# Patient Record
Sex: FEMALE | Race: OTHER | Employment: STUDENT | URBAN - METROPOLITAN AREA
[De-identification: names, ages, dates, MRNs, and addresses within clinical notes are randomized per-mention and may not be internally consistent; named-entity substitution may affect disease eponyms.]

---

## 2022-05-01 ENCOUNTER — HOSPITAL ENCOUNTER (EMERGENCY)
Age: 27
Discharge: HOME | End: 2022-05-01
Attending: STUDENT IN AN ORGANIZED HEALTH CARE EDUCATION/TRAINING PROGRAM
Payer: COMMERCIAL

## 2022-05-01 VITALS
RESPIRATION RATE: 16 BRPM | DIASTOLIC BLOOD PRESSURE: 86 MMHG | TEMPERATURE: 98.1 F | OXYGEN SATURATION: 99 % | SYSTOLIC BLOOD PRESSURE: 138 MMHG | HEART RATE: 69 BPM

## 2022-05-01 DIAGNOSIS — N93.9 VAGINAL BLEEDING: Primary | ICD-10-CM

## 2022-05-01 LAB
ALBUMIN SERPL-MCNC: 5 G/DL (ref 3.4–5)
ALBUMIN/GLOB SERPL: 1.7 {RATIO} (ref 0.8–1.7)
ALP SERPL-CCNC: 40 U/L (ref 45–117)
ALT SERPL-CCNC: 16 U/L (ref 13–56)
ANION GAP SERPL CALC-SCNC: 4 MMOL/L (ref 3–18)
APPEARANCE UR: ABNORMAL
AST SERPL-CCNC: 12 U/L (ref 10–38)
BACTERIA URNS QL MICRO: NEGATIVE /HPF
BASOPHILS # BLD: 0.1 K/UL (ref 0–0.1)
BASOPHILS NFR BLD: 1 % (ref 0–2)
BILIRUB SERPL-MCNC: 1.2 MG/DL (ref 0.2–1)
BILIRUB UR QL: ABNORMAL
BUN SERPL-MCNC: 14 MG/DL (ref 7–18)
BUN/CREAT SERPL: 16 (ref 12–20)
CALCIUM SERPL-MCNC: 10.2 MG/DL (ref 8.5–10.1)
CHLORIDE SERPL-SCNC: 106 MMOL/L (ref 100–111)
CO2 SERPL-SCNC: 27 MMOL/L (ref 21–32)
COLOR UR: ABNORMAL
CREAT SERPL-MCNC: 0.9 MG/DL (ref 0.6–1.3)
DIFFERENTIAL METHOD BLD: ABNORMAL
EOSINOPHIL # BLD: 0.1 K/UL (ref 0–0.4)
EOSINOPHIL NFR BLD: 1 % (ref 0–5)
EPITH CASTS URNS QL MICRO: NORMAL /LPF (ref 0–5)
ERYTHROCYTE [DISTWIDTH] IN BLOOD BY AUTOMATED COUNT: 12 % (ref 11.6–14.5)
GLOBULIN SER CALC-MCNC: 2.9 G/DL (ref 2–4)
GLUCOSE SERPL-MCNC: 85 MG/DL (ref 74–99)
GLUCOSE UR STRIP.AUTO-MCNC: NEGATIVE MG/DL
HCG SERPL QL: NEGATIVE
HCG SERPL-ACNC: <1 MIU/ML (ref 0–10)
HCT VFR BLD AUTO: 40.7 % (ref 35–45)
HGB BLD-MCNC: 13.6 G/DL (ref 12–16)
HGB UR QL STRIP: ABNORMAL
IMM GRANULOCYTES # BLD AUTO: 0 K/UL (ref 0–0.04)
IMM GRANULOCYTES NFR BLD AUTO: 0 % (ref 0–0.5)
KETONES UR QL STRIP.AUTO: ABNORMAL MG/DL
LEUKOCYTE ESTERASE UR QL STRIP.AUTO: NEGATIVE
LYMPHOCYTES # BLD: 2 K/UL (ref 0.9–3.6)
LYMPHOCYTES NFR BLD: 17 % (ref 21–52)
MCH RBC QN AUTO: 28.3 PG (ref 24–34)
MCHC RBC AUTO-ENTMCNC: 33.4 G/DL (ref 31–37)
MCV RBC AUTO: 84.8 FL (ref 78–100)
MONOCYTES # BLD: 0.8 K/UL (ref 0.05–1.2)
MONOCYTES NFR BLD: 7 % (ref 3–10)
NEUTS SEG # BLD: 8.6 K/UL (ref 1.8–8)
NEUTS SEG NFR BLD: 75 % (ref 40–73)
NITRITE UR QL STRIP.AUTO: NEGATIVE
NRBC # BLD: 0 K/UL (ref 0–0.01)
NRBC BLD-RTO: 0 PER 100 WBC
PH UR STRIP: 6 [PH] (ref 5–8)
PLATELET # BLD AUTO: 318 K/UL (ref 135–420)
PMV BLD AUTO: 10.6 FL (ref 9.2–11.8)
POTASSIUM SERPL-SCNC: 4.4 MMOL/L (ref 3.5–5.5)
PROT SERPL-MCNC: 7.9 G/DL (ref 6.4–8.2)
PROT UR STRIP-MCNC: 100 MG/DL
RBC # BLD AUTO: 4.8 M/UL (ref 4.2–5.3)
RBC #/AREA URNS HPF: NORMAL /HPF (ref 0–5)
SODIUM SERPL-SCNC: 137 MMOL/L (ref 136–145)
SP GR UR REFRACTOMETRY: 1.02 (ref 1–1.03)
TROPONIN-HIGH SENSITIVITY: 4 NG/L (ref 0–54)
UROBILINOGEN UR QL STRIP.AUTO: 0.2 EU/DL (ref 0.2–1)
WBC # BLD AUTO: 11.6 K/UL (ref 4.6–13.2)
WBC URNS QL MICRO: NORMAL /HPF (ref 0–4)

## 2022-05-01 PROCEDURE — 85025 COMPLETE CBC W/AUTO DIFF WBC: CPT

## 2022-05-01 PROCEDURE — 81001 URINALYSIS AUTO W/SCOPE: CPT

## 2022-05-01 PROCEDURE — 84703 CHORIONIC GONADOTROPIN ASSAY: CPT

## 2022-05-01 PROCEDURE — 99284 EMERGENCY DEPT VISIT MOD MDM: CPT

## 2022-05-01 PROCEDURE — 80053 COMPREHEN METABOLIC PANEL: CPT

## 2022-05-01 PROCEDURE — 84484 ASSAY OF TROPONIN QUANT: CPT

## 2022-05-01 PROCEDURE — 84702 CHORIONIC GONADOTROPIN TEST: CPT

## 2022-05-01 PROCEDURE — 93005 ELECTROCARDIOGRAM TRACING: CPT

## 2022-05-01 NOTE — ED PROVIDER NOTES
EMERGENCY DEPARTMENT HISTORY AND PHYSICAL EXAM    Date: 5/1/2022  Patient Name: Panda Villalta    History of Presenting Illness     Chief Complaint   Patient presents with    Vaginal Bleeding         History Provided By: Patient    Additional History (Context): Panda Villalta is a 27 y.o. female with No significant past medical history who presents with complaint of vaginal bleeding since yesterday. Initially was brownish mucoid he and then more bright red. She took Plan B on Tuesday. She had had a normal menstrual cycle just 2 days prior to this. Denies any pelvic cramping pain fever or flank pain. Denies lightheadedness or dizziness but she said last night she also had some palpitations. PCP: None        Past History     Past Medical History:  No past medical history on file. Past Surgical History:  No past surgical history on file. Family History:  No family history on file. Social History:  Social History     Tobacco Use    Smoking status: Not on file    Smokeless tobacco: Not on file   Substance Use Topics    Alcohol use: Not on file    Drug use: Not on file       Allergies:  No Known Allergies      Review of Systems   Review of Systems   Constitutional: Negative. HENT: Negative. Eyes: Negative. Respiratory: Negative. Cardiovascular: Positive for palpitations. Gastrointestinal: Negative. Endocrine: Negative. Genitourinary: Positive for vaginal bleeding. Negative for dysuria, flank pain, frequency, pelvic pain and vaginal discharge. Musculoskeletal: Negative. Skin: Negative. Allergic/Immunologic: Negative. Neurological: Negative for dizziness and light-headedness. Hematological: Negative. Psychiatric/Behavioral: Negative. All Other Systems Negative  Physical Exam     Vitals:    05/01/22 1523   BP: 138/86   Pulse: 69   Resp: 16   Temp: 98.1 °F (36.7 °C)   SpO2: 99%     Physical Exam  Vitals and nursing note reviewed.    Constitutional: Appearance: She is well-developed. HENT:      Head: Normocephalic and atraumatic. Eyes:      Pupils: Pupils are equal, round, and reactive to light. Neck:      Thyroid: No thyromegaly. Vascular: No JVD. Trachea: No tracheal deviation. Cardiovascular:      Rate and Rhythm: Normal rate and regular rhythm. Pulses: Normal pulses. Heart sounds: Normal heart sounds. No murmur heard. No friction rub. No gallop. Pulmonary:      Effort: Pulmonary effort is normal. No respiratory distress. Breath sounds: Normal breath sounds. No stridor. No wheezing or rales. Chest:      Chest wall: No tenderness. Abdominal:      General: There is no distension. Palpations: Abdomen is soft. There is no mass. Tenderness: There is no abdominal tenderness. There is no guarding or rebound. Musculoskeletal:         General: No tenderness. Lymphadenopathy:      Cervical: No cervical adenopathy. Skin:     General: Skin is warm and dry. Coloration: Skin is not pale. Findings: No erythema or rash. Neurological:      Mental Status: She is alert and oriented to person, place, and time. Psychiatric:         Behavior: Behavior normal.         Thought Content:  Thought content normal.            Diagnostic Study Results     Labs -     Recent Results (from the past 12 hour(s))   EKG, 12 LEAD, INITIAL    Collection Time: 05/01/22  4:28 PM   Result Value Ref Range    Ventricular Rate 70 BPM    Atrial Rate 70 BPM    P-R Interval 110 ms    QRS Duration 80 ms    Q-T Interval 374 ms    QTC Calculation (Bezet) 403 ms    Calculated P Axis 78 degrees    Calculated R Axis 83 degrees    Calculated T Axis 20 degrees    Diagnosis       Sinus rhythm with short CT  Otherwise normal ECG  No previous ECGs available     CBC WITH AUTOMATED DIFF    Collection Time: 05/01/22  4:30 PM   Result Value Ref Range    WBC 11.6 4.6 - 13.2 K/uL    RBC 4.80 4.20 - 5.30 M/uL    HGB 13.6 12.0 - 16.0 g/dL    HCT 40.7 35.0 - 45.0 %    MCV 84.8 78.0 - 100.0 FL    MCH 28.3 24.0 - 34.0 PG    MCHC 33.4 31.0 - 37.0 g/dL    RDW 12.0 11.6 - 14.5 %    PLATELET 711 626 - 013 K/uL    MPV 10.6 9.2 - 11.8 FL    NRBC 0.0 0  WBC    ABSOLUTE NRBC 0.00 0.00 - 0.01 K/uL    NEUTROPHILS 75 (H) 40 - 73 %    LYMPHOCYTES 17 (L) 21 - 52 %    MONOCYTES 7 3 - 10 %    EOSINOPHILS 1 0 - 5 %    BASOPHILS 1 0 - 2 %    IMMATURE GRANULOCYTES 0 0.0 - 0.5 %    ABS. NEUTROPHILS 8.6 (H) 1.8 - 8.0 K/UL    ABS. LYMPHOCYTES 2.0 0.9 - 3.6 K/UL    ABS. MONOCYTES 0.8 0.05 - 1.2 K/UL    ABS. EOSINOPHILS 0.1 0.0 - 0.4 K/UL    ABS. BASOPHILS 0.1 0.0 - 0.1 K/UL    ABS. IMM. GRANS. 0.0 0.00 - 0.04 K/UL    DF AUTOMATED     METABOLIC PANEL, COMPREHENSIVE    Collection Time: 05/01/22  4:30 PM   Result Value Ref Range    Sodium 137 136 - 145 mmol/L    Potassium 4.4 3.5 - 5.5 mmol/L    Chloride 106 100 - 111 mmol/L    CO2 27 21 - 32 mmol/L    Anion gap 4 3.0 - 18 mmol/L    Glucose 85 74 - 99 mg/dL    BUN 14 7.0 - 18 MG/DL    Creatinine 0.90 0.6 - 1.3 MG/DL    BUN/Creatinine ratio 16 12 - 20      GFR est AA >60 >60 ml/min/1.73m2    GFR est non-AA >60 >60 ml/min/1.73m2    Calcium 10.2 (H) 8.5 - 10.1 MG/DL    Bilirubin, total 1.2 (H) 0.2 - 1.0 MG/DL    ALT (SGPT) 16 13 - 56 U/L    AST (SGOT) 12 10 - 38 U/L    Alk.  phosphatase 40 (L) 45 - 117 U/L    Protein, total 7.9 6.4 - 8.2 g/dL    Albumin 5.0 3.4 - 5.0 g/dL    Globulin 2.9 2.0 - 4.0 g/dL    A-G Ratio 1.7 0.8 - 1.7     TROPONIN-HIGH SENSITIVITY    Collection Time: 05/01/22  4:30 PM   Result Value Ref Range    Troponin-High Sensitivity 4 0 - 54 ng/L   HCG QL SERUM    Collection Time: 05/01/22  4:30 PM   Result Value Ref Range    HCG, Ql. Negative NEG     URINALYSIS W/ RFLX MICROSCOPIC    Collection Time: 05/01/22  4:40 PM   Result Value Ref Range    Color DARK YELLOW      Appearance BLOODY      Specific gravity 1.025 1.005 - 1.030      pH (UA) 6.0 5.0 - 8.0      Protein 100 (A) NEG mg/dL    Glucose Negative NEG mg/dL    Ketone TRACE (A) NEG mg/dL    Bilirubin SMALL (A) NEG      Blood LARGE (A) NEG      Urobilinogen 0.2 0.2 - 1.0 EU/dL    Nitrites Negative NEG      Leukocyte Esterase Negative NEG     URINE MICROSCOPIC ONLY    Collection Time: 05/01/22  4:40 PM   Result Value Ref Range    WBC 4 to 10 0 - 4 /hpf    RBC TOO NUMEROUS TO COUNT 0 - 5 /hpf    Epithelial cells 1+ 0 - 5 /lpf    Bacteria Negative NEG /hpf       Radiologic Studies -   No orders to display     CT Results  (Last 48 hours)    None        CXR Results  (Last 48 hours)    None            Medical Decision Making   I am the first provider for this patient. I reviewed the vital signs, available nursing notes, past medical history, past surgical history, family history and social history. Vital Signs-Reviewed the patient's vital signs. Records Reviewed: Nursing Notes    Procedures:  Procedures    Provider Notes (Medical Decision Making): Labs are stable unremarkable EKG. Patient has no tenderness or complaint of pain. Has not even used a whole pad for her vaginal bleeding. Will have her follow-up with GYN as an outpatient. Do not know if her palpitations yesterday were from anxiety? But certainly no acute process here with very stable vital signs and blood pressure. MED RECONCILIATION:  No current facility-administered medications for this encounter. No current outpatient medications on file. Disposition:  home    DISCHARGE NOTE:   5:36 PM    Pt has been reexamined. Patient has no new complaints, changes, or physical findings. Care plan outlined and precautions discussed. Results of labs were reviewed with the patient. All medications were reviewed with the patient. All of pt's questions and concerns were addressed. Patient was instructed and agrees to follow up with PCP, GYN, as well as to return to the ED upon further deterioration. Patient is ready to go home.     Follow-up Information     Follow up With Specialties Details Why Contact Info [place] [facility], [facility] AND [facility] Obstetrics & Gynecology Schedule an appointment as soon as possible for a visit in 1 day  [address] [facility], [address] [place] [facility] EMERGENCY DEPT Emergency Medicine  If symptoms worsen return immediately [address]  [phone]          There are no discharge medications for this patient. Diagnosis     Clinical Impression:   1.  Vaginal bleeding

## 2022-05-01 NOTE — ED TRIAGE NOTES
Pt ended period . Pt took a plan B on Tuesday night. Pt had brown discharge, but now is bleeding red blood. Concerned due to the bleeding.   Pt wants STD testing also.

## 2022-05-02 LAB
ATRIAL RATE: 70 BPM
CALCULATED P AXIS, ECG09: 78 DEGREES
CALCULATED R AXIS, ECG10: 83 DEGREES
CALCULATED T AXIS, ECG11: 20 DEGREES
DIAGNOSIS, 93000: NORMAL
P-R INTERVAL, ECG05: 110 MS
Q-T INTERVAL, ECG07: 374 MS
QRS DURATION, ECG06: 80 MS
QTC CALCULATION (BEZET), ECG08: 403 MS
VENTRICULAR RATE, ECG03: 70 BPM

## 2022-11-16 ENCOUNTER — TRANSCRIBE ORDER (OUTPATIENT)
Dept: SCHEDULING | Age: 27
End: 2022-11-16

## 2022-11-16 DIAGNOSIS — S13.9XXD ACUTE CERVICAL SPRAIN, SUBSEQUENT ENCOUNTER: Primary | ICD-10-CM

## 2022-12-01 ENCOUNTER — HOSPITAL ENCOUNTER (OUTPATIENT)
Dept: MRI IMAGING | Age: 27
End: 2022-12-01
Attending: ORTHOPAEDIC SURGERY
Payer: COMMERCIAL

## 2022-12-01 DIAGNOSIS — S13.9XXD ACUTE CERVICAL SPRAIN, SUBSEQUENT ENCOUNTER: ICD-10-CM

## 2022-12-01 PROCEDURE — 72141 MRI NECK SPINE W/O DYE: CPT

## 2023-03-21 ENCOUNTER — OFFICE VISIT (OUTPATIENT)
Age: 28
End: 2023-03-21

## 2023-03-21 VITALS
OXYGEN SATURATION: 99 % | BODY MASS INDEX: 18.33 KG/M2 | TEMPERATURE: 98.1 F | WEIGHT: 110 LBS | HEART RATE: 60 BPM | HEIGHT: 65 IN

## 2023-03-21 DIAGNOSIS — M79.18 MYOFASCIAL PAIN: ICD-10-CM
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
## 2023-03-21 DIAGNOSIS — S16.1XXS STRAIN OF NECK MUSCLE, SEQUELA: ICD-10-CM

## 2023-03-21 DIAGNOSIS — R29.2 HYPERREFLEXIA: ICD-10-CM

## 2023-03-21 DIAGNOSIS — M54.2 NECK PAIN: Primary | ICD-10-CM

## 2023-03-21 DIAGNOSIS — M62.838 MUSCLE SPASM: ICD-10-CM

## 2023-03-21 RX ORDER — METHYLPREDNISOLONE 4 MG/1
TABLET ORAL
Qty: 1 KIT | Refills: 0 | Status: SHIPPED | OUTPATIENT
Start: 2023-03-21 | End: 2023-03-21 | Stop reason: SDUPTHER

## 2023-03-21 RX ORDER — METHYLPREDNISOLONE 4 MG/1
TABLET ORAL
Qty: 1 KIT | Refills: 0 | Status: SHIPPED | OUTPATIENT
Start: 2023-03-21

## 2023-03-21 RX ORDER — MELOXICAM 7.5 MG/1
7.5 TABLET ORAL DAILY
Qty: 30 TABLET | Refills: 1 | Status: SHIPPED | OUTPATIENT
Start: 2023-03-21 | End: 2023-03-21

## 2023-03-21 RX ORDER — MELOXICAM 7.5 MG/1
7.5 TABLET ORAL DAILY
Qty: 30 TABLET | Refills: 1 | Status: SHIPPED | OUTPATIENT
Start: 2023-03-21

## 2023-03-21 ASSESSMENT — PATIENT HEALTH QUESTIONNAIRE - PHQ9
SUM OF ALL RESPONSES TO PHQ9 QUESTIONS 1 & 2: 0
2. FEELING DOWN, DEPRESSED OR HOPELESS: 0
SUM OF ALL RESPONSES TO PHQ QUESTIONS 1-9: 0
SUM OF ALL RESPONSES TO PHQ QUESTIONS 1-9: 0
1. LITTLE INTEREST OR PLEASURE IN DOING THINGS: 0
SUM OF ALL RESPONSES TO PHQ QUESTIONS 1-9: 0
SUM OF ALL RESPONSES TO PHQ QUESTIONS 1-9: 0

## 2023-03-21 NOTE — PROGRESS NOTES
Leanna Rodriguez presents today for   Chief Complaint   Patient presents with    Back Pain    Neck Pain       Is someone accompanying this pt? no    Is the patient using any DME equipment during OV? no    Depression Screening:  No flowsheet data found. Learning Assessment:  No flowsheet data found. Abuse Screening:  No flowsheet data found. Fall Risk  No flowsheet data found. OPIOID RISK TOOL  No flowsheet data found. Coordination of Care:  1. Have you been to the ER, urgent care clinic since your last visit? no  Hospitalized since your last visit? no    2. Have you seen or consulted any other health care providers outside of the [address] [place] [address] since your last visit? no Include any pap smears or colon screening.  no    Last  Checked n/a    Last UDS Checked n/a  Last Pain Agreement Signed n/a
bilaterally. No pain with heel or toe walking. No difficulty with single leg stance bilaterally. Biceps  Triceps Deltoids Wrist Ext Wrist Flex Hand Intrin   Right +4/5 +4/5 +4/5 +4/5 +4/5 +4/5   Left +4/5 +4/5 +4/5 +4/5 +4/5 +4/5      Hip Flex  Quads Hamstrings Ankle DF EHL Ankle PF   Right +4/5 +4/5 +4/5 +4/5 +4/5 +4/5   Left +4/5 +4/5 +4/5 +4/5 +4/5 +4/5     IMAGING:    Cervical spine MRI from 12/01/2022 was personally reviewed with the patient and demonstrated:     Findings: The visualized craniocervical junction and posterior fossa are  unremarkable. Cervical cord:  No intrinsic signal abnormalities are demonstrated in the  cervical cord. No T1 or STIR marrow signal abnormalities are seen to suggest acute facet,  marrow or soft tissue edema, allowing for motion. Alignment: Straightening of cervical lordosis . C2/3-C7/T1: No significant disc protrusion or limiting stenosis is seen. There  is mild disc desiccation from C2-C6. Upper thoracic spine: Evaluated only in limited, visualized sagittal extent. Other:       IMPRESSION:     1. Motion degradation limits sensitivity. Mild cervical disc desiccation. 2. There is straightening of the normal cervical lordosis. This can be  positional or related to musculoligamentous injury/spasm and should be  clinically correlated for significance. Written by Adria Mayen, as dictated by Luciana Cervantes MD.  I, Dr. Luciana Cervantes confirm that all documentation is accurate.

## 2023-03-30 ENCOUNTER — TELEPHONE (OUTPATIENT)
Age: 28
End: 2023-03-30
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
## 2023-03-30 NOTE — TELEPHONE ENCOUNTER
As per Dr Alexandra Veronica Notes. She will consider ordering another MRI, depending responds form Physical therapy. Called Patient, no answer, left message that she can speak with Dr Alexandra Veronica at her next appt. about ordering another MRI

## 2023-03-30 NOTE — TELEPHONE ENCOUNTER
Patient called and requested to have a MRI order for the Lower lumbar and Cervical spine. She stated that she was told that her cervical mri was blurry and she needs one done on her lumbar as well. Patient can be reached at [phone].

## 2023-03-30 NOTE — TELEPHONE ENCOUNTER
Called and spoke with Dr Rehana Newman. She will need appt for discuss lower back pain. Called patient, appt made for April 10.

## 2023-04-10 PROBLEM — R82.5 POSITIVE URINE DRUG SCREEN: Status: ACTIVE | Noted: 2023-01-06

## 2023-05-10 ENCOUNTER — OFFICE VISIT (OUTPATIENT)
Age: 28
End: 2023-05-10

## 2023-05-10 VITALS
OXYGEN SATURATION: 99 % | SYSTOLIC BLOOD PRESSURE: 108 MMHG | HEIGHT: 65 IN | DIASTOLIC BLOOD PRESSURE: 66 MMHG | RESPIRATION RATE: 16 BRPM | HEART RATE: 64 BPM | BODY MASS INDEX: 20.83 KG/M2 | TEMPERATURE: 97.1 F | WEIGHT: 125 LBS

## 2023-05-10 DIAGNOSIS — M79.18 MYOFASCIAL PAIN: ICD-10-CM

## 2023-05-10 DIAGNOSIS — M62.838 MUSCLE SPASM: ICD-10-CM

## 2023-05-10 DIAGNOSIS — M54.50 LUMBAR PAIN: Primary | ICD-10-CM

## 2023-05-10 RX ORDER — CELECOXIB 100 MG/1
CAPSULE ORAL
Qty: 60 CAPSULE | Refills: 2 | Status: SHIPPED | OUTPATIENT
Start: 2023-05-10

## 2023-05-10 NOTE — PROGRESS NOTES
Dosepak  with benefit. Pt reports taking Mobic and having vivid dreams. She reports using a heating pad to relax her muscles. She denies any leg weakness or current radicular symptoms. She remains very active and works as a  at [employer] 6 days / week from 3:30 pm - 10/11:30 pm.  She also is taking online classes. Pt at this time desires to proceed with medication evaluation. Pain Scale: 5/10    PCP: None None     History reviewed. No pertinent past medical history. Social History     Tobacco Use    Smoking status: Never    Smokeless tobacco: Never          Current Outpatient Medications:     celecoxib (CELEBREX) 100 MG capsule, 1 po bid as needed for pain ; take with food, Disp: 60 capsule, Rfl: 2    meloxicam (MOBIC) 7.5 MG tablet, Take 1 tablet by mouth daily (Patient not taking: Reported on 4/10/2023), Disp: 30 tablet, Rfl: 1    methylPREDNISolone (MEDROL DOSEPACK) 4 MG tablet, Per dose pack instructions. (Patient not taking: Reported on 4/10/2023), Disp: 1 kit, Rfl: 0     No Known Allergies      REVIEW OF SYSTEMS    Constitutional: Negative for fever, chills, or weight change. Respiratory: Negative for cough or shortness of breath. Cardiovascular: Negative for chest pain or palpitations. Gastrointestinal: Negative for acid reflux, change in bowel habits, or constipation. Genitourinary: Negative for dysuria and flank pain. Musculoskeletal: Positive for cervical pain. Positive for lumbar stiffness/tightness. Skin: Negative for rash. Neurological: Negative for headaches, dizziness, or numbness. Endo/Heme/Allergies: Negative for increased bruising. Psychiatric/Behavioral: Negative for difficulty with sleep. As per HPI    PHYSICAL EXAMINATION  /66   Pulse 64   Temp 97.1 °F (36.2 °C) (Temporal)   Resp 16   Ht 5' 5\" (1.651 m)   Wt 125 lb (56.7 kg)   SpO2 99%   BMI 20.80 kg/m²     Constitutional: Awake, alert, and in no acute distress.   Neurological: 1+

## 2023-07-07 ENCOUNTER — OFFICE VISIT (OUTPATIENT)
Age: 28
End: 2023-07-07

## 2023-07-07 VITALS
BODY MASS INDEX: 20.19 KG/M2 | HEART RATE: 60 BPM | HEIGHT: 65 IN | RESPIRATION RATE: 14 BRPM | TEMPERATURE: 97.8 F | OXYGEN SATURATION: 100 % | SYSTOLIC BLOOD PRESSURE: 115 MMHG | WEIGHT: 121.2 LBS | DIASTOLIC BLOOD PRESSURE: 66 MMHG

## 2023-07-07 DIAGNOSIS — M62.838 MUSCLE SPASM: ICD-10-CM

## 2023-07-07 DIAGNOSIS — M47.816 LUMBAR FACET ARTHROPATHY: ICD-10-CM

## 2023-07-07 DIAGNOSIS — M54.50 LUMBAR PAIN: Primary | ICD-10-CM
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
## 2023-07-07 ASSESSMENT — PATIENT HEALTH QUESTIONNAIRE - PHQ9
1. LITTLE INTEREST OR PLEASURE IN DOING THINGS: 0
2. FEELING DOWN, DEPRESSED OR HOPELESS: 0
SUM OF ALL RESPONSES TO PHQ QUESTIONS 1-9: 0
SUM OF ALL RESPONSES TO PHQ QUESTIONS 1-9: 0
SUM OF ALL RESPONSES TO PHQ9 QUESTIONS 1 & 2: 0
SUM OF ALL RESPONSES TO PHQ QUESTIONS 1-9: 0
SUM OF ALL RESPONSES TO PHQ QUESTIONS 1-9: 0
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
## 2024-05-17 ENCOUNTER — TELEPHONE (OUTPATIENT)
Age: 29
End: 2024-05-17

## 2024-05-28 ENCOUNTER — TELEPHONE (OUTPATIENT)
Age: 29
End: 2024-05-28

## 2024-05-28 NOTE — TELEPHONE ENCOUNTER
Tried to reach pt at the number that was left but it was not the right number. Checked chart and the number on file is a law firms number and they haven't heard of pt. Went to look at previous messages and found another number and when I called, it was her father. I asked if he had a number for her and he gave me the same number which is the law firm. I sent pt a Plan Me Up message In hopes of a response. If pt call back please get a number where we can reach her.

## 2024-05-28 NOTE — TELEPHONE ENCOUNTER
Patient called and said she has an appt for a medication refill with  tomorrow.    Patient would like to know if that appt could be changed to a VV appt , due to she can not miss work.     Patient would like a call back at tel. [phone].

## 2024-05-29 ENCOUNTER — OFFICE VISIT (OUTPATIENT)
Age: 29
End: 2024-05-29
Payer: COMMERCIAL

## 2024-05-29 VITALS
OXYGEN SATURATION: 99 % | HEIGHT: 65 IN | WEIGHT: 112 LBS | TEMPERATURE: 98.1 F | HEART RATE: 55 BPM | BODY MASS INDEX: 18.66 KG/M2 | SYSTOLIC BLOOD PRESSURE: 109 MMHG | DIASTOLIC BLOOD PRESSURE: 70 MMHG

## 2024-05-29 DIAGNOSIS — M79.18 MYOFASCIAL PAIN: ICD-10-CM

## 2024-05-29 DIAGNOSIS — M62.838 MUSCLE SPASM: ICD-10-CM

## 2024-05-29 DIAGNOSIS — M47.816 LUMBAR FACET ARTHROPATHY: ICD-10-CM

## 2024-05-29 DIAGNOSIS — M54.50 LUMBAR PAIN: Primary | ICD-10-CM

## 2024-05-29 PROCEDURE — 99214 OFFICE O/P EST MOD 30 MIN: CPT | Performed by: PHYSICAL MEDICINE & REHABILITATION

## 2024-05-29 RX ORDER — SPIRONOLACTONE 100 MG/1
100 TABLET, FILM COATED ORAL NIGHTLY
COMMUNITY
Start: 2024-03-26

## 2024-05-29 RX ORDER — KETOCONAZOLE 20 MG/ML
SHAMPOO TOPICAL
COMMUNITY
Start: 2024-03-26

## 2024-05-29 RX ORDER — CELECOXIB 100 MG/1
CAPSULE ORAL
Qty: 60 CAPSULE | Refills: 2 | Status: SHIPPED | OUTPATIENT
Start: 2024-05-29

## 2024-05-29 RX ORDER — KETOCONAZOLE 20 MG/G
CREAM TOPICAL
COMMUNITY
Start: 2024-03-26
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
## 2024-05-29 NOTE — PATIENT INSTRUCTIONS
Sacroiliac Pain: Exercises  Introduction  Here are some examples of exercises for you to try. The exercises may be suggested for a condition or for rehabilitation. Start each exercise slowly. Ease off the exercises if you start to have pain.  You will be told when to start these exercises and which ones will work best for you.  How to do the exercises  Knee-to-chest stretch  Do not do the knee-to-chest exercise if it causes or increases back or leg pain.  Lie on your back with your knees bent and your feet flat on the floor. You can put a small pillow under your head and neck if it is more comfortable.  Grasp your hands under one knee and bring the knee to your chest, keeping the other foot flat on the floor.  Keep your lower back pressed to the floor. Hold for at least 15 to 30 seconds.  Relax and lower the knee to the starting position. Repeat with the other leg.  Repeat 2 to 4 times with each leg.  To get more stretch, keep your other leg flat on the floor while pulling your knee to your chest.  Bridging  Lie on your back with both knees bent. Your knees should be bent about 90 degrees.  Tighten your belly muscles by pulling in your belly button toward your spine. Then push your feet into the floor, squeeze your buttocks, and lift your hips off the floor until your shoulders, hips, and knees are all in a straight line.  Hold for about 6 seconds as you continue to breathe normally, and then slowly lower your hips back down to the floor and rest for up to 10 seconds.  Repeat 8 to 12 times.  Hip extension  Get down on your hands and knees on the floor.  Keeping your back and neck straight, lift one leg straight out behind you. When you lift your leg, keep your hips level. Don't let your back twist, and don't let your hip drop toward the floor.  Hold for 6 seconds. Repeat 8 to 12 times with each leg.  If you feel steady and strong when you do this exercise, you can make it more difficult. To do this, when you

## 2025-01-10 ENCOUNTER — TELEPHONE (OUTPATIENT)
Age: 30
End: 2025-01-10

## 2025-01-10 NOTE — TELEPHONE ENCOUNTER
Patient is requesting a refill for her meloxicam (MOBIC) 7.5 MG tablet     Please advise patient when prescription has been sent  Patient tel [phone]      [id number] -   [place], [place] - [address] -   P [phone] - F [phone]

## 2025-01-10 NOTE — TELEPHONE ENCOUNTER
Pt has upcoming appt. (NS the last appt).  Need to evaluate her.  She has not been given mobic in almost 2 year.

## 2025-01-14 NOTE — TELEPHONE ENCOUNTER
Called to speak with patient and inform her she would need to be re-evaluated prior to a refill on her Mobic.  I was hung up on.

## 2025-02-04 ENCOUNTER — OFFICE VISIT (OUTPATIENT)
Age: 30
End: 2025-02-04
Payer: COMMERCIAL

## 2025-02-04 ENCOUNTER — TELEPHONE (OUTPATIENT)
Age: 30
End: 2025-02-04

## 2025-02-04 VITALS
SYSTOLIC BLOOD PRESSURE: 135 MMHG | OXYGEN SATURATION: 100 % | WEIGHT: 112.4 LBS | DIASTOLIC BLOOD PRESSURE: 83 MMHG | BODY MASS INDEX: 18.73 KG/M2 | HEIGHT: 65 IN | HEART RATE: 74 BPM | RESPIRATION RATE: 18 BRPM

## 2025-02-04 DIAGNOSIS — M62.838 MUSCLE SPASM: ICD-10-CM

## 2025-02-04 DIAGNOSIS — M54.50 LUMBAR PAIN: Primary | ICD-10-CM

## 2025-02-04 DIAGNOSIS — M47.816 LUMBAR FACET ARTHROPATHY: ICD-10-CM

## 2025-02-04 DIAGNOSIS — M79.18 MYOFASCIAL PAIN: ICD-10-CM

## 2025-02-04 DIAGNOSIS — M54.2 NECK PAIN: ICD-10-CM

## 2025-02-04 PROCEDURE — 99214 OFFICE O/P EST MOD 30 MIN: CPT | Performed by: NURSE PRACTITIONER

## 2025-02-04 RX ORDER — MINOCYCLINE HYDROCHLORIDE 100 MG/1
100 CAPSULE ORAL 2 TIMES DAILY WITH MEALS
COMMUNITY
Start: 2024-12-24

## 2025-02-04 RX ORDER — MELOXICAM 7.5 MG/1
7.5 TABLET ORAL DAILY
Qty: 30 TABLET | Refills: 2 | Status: SHIPPED | OUTPATIENT
Start: 2025-02-04

## 2025-02-04 NOTE — PROGRESS NOTES
Chief complaint   Chief Complaint   Patient presents with    Back Problem    Pain    Back Pain       History of Present Illness:  Ladan Mathias is a  29 y.o.  female who comes in today after last seen Dr. Mcdonnell on 8/29/2024.  She states that she continues to have low back pain without radicular pain.  She had been on Celebrex at 1 point but it made her have bad dreams and she has started taking Mobic 7.5 mg that she has had in the past.  This does work very well for her and she would like to continue it.  She is having a lot of pain in her feet and has bought some really good new balance shoes but she still having the issue.  She just graduated from [employer] and hopes to eventually be able to go to medical school.  She no longer works for [employer] but is now working for [employer] as a .  She denies fever bowel bladder dysfunction.  She is a non-smoker.      Physical Exam: The patient is a 29-year-old female well-developed well-nourished who is alert and oriented with a normal mood and affect.  She has a full weightbearing antalgic gait patient using assist device.  She has 5 out of 5 strength bilateral lower extremities.  Negative straight leg raise.  She does have pain with hyperextension lumbar spine      Assessment and Plan:.  Is a patient who has low back pain and has a diagnosis of lumbar facet arthropathy.  I am going to put her in physical therapy for her back pain.  I will also add dry needling since she also complains of some myofascial pain.  I will refill Mobic 7.5 mg daily.  I talked to her about only taking it as needed with food.  Do not take other anti-nflammatories with it.  She is also serjio check out the [employer] feet store or 1 foot to foot for her foot pain.  Will see her back in 2 months.  If she is not improved we will get a MRI of her lumbar spine.      Ladan was seen today for back problem, pain and back pain.    Diagnoses and all orders for this visit:    Lumbar

## 2025-02-04 NOTE — TELEPHONE ENCOUNTER
While checking out, pt asked if she can be referred to Dr. Pal for physical therapy.     Pt can be reached at [phone]

## 2025-02-04 NOTE — PROGRESS NOTES
Ladan Bowlingison presents today for   Chief Complaint   Patient presents with    Back Problem    Pain    Back Pain       Is someone accompanying this pt? yes    Is the patient using any DME equipment during OV? no    Depression Screening:       No data to display                Learning Assessment:  Failed to redirect to the Timeline version of the [employer] SmartLink.    Abuse Screening:       No data to display                Fall Risk  Failed to redirect to the Timeline version of the [employer] SmartLink.    OPIOID RISK TOOL  Failed to redirect to the Timeline version of the [employer] SmartLink.    Coordination of Care:  1. Have you been to the ER, urgent care clinic since your last visit? no  Hospitalized since your last visit? no    2. Have you seen or consulted any other health care providers outside of the [facility] since your last visit? no Include any pap smears or colon screening. no

## 2025-02-11 ENCOUNTER — TELEPHONE (OUTPATIENT)
Age: 30
End: 2025-02-11

## 2025-02-11 ENCOUNTER — OFFICE VISIT (OUTPATIENT)
Facility: CLINIC | Age: 30
End: 2025-02-11
Payer: COMMERCIAL

## 2025-02-11 VITALS
HEIGHT: 65 IN | HEART RATE: 48 BPM | WEIGHT: 118 LBS | SYSTOLIC BLOOD PRESSURE: 132 MMHG | DIASTOLIC BLOOD PRESSURE: 83 MMHG | RESPIRATION RATE: 16 BRPM | BODY MASS INDEX: 19.66 KG/M2 | OXYGEN SATURATION: 100 %

## 2025-02-11 DIAGNOSIS — Z11.3 SCREENING FOR STDS (SEXUALLY TRANSMITTED DISEASES): ICD-10-CM

## 2025-02-11 DIAGNOSIS — Z13.29 SCREENING FOR THYROID DISORDER: ICD-10-CM

## 2025-02-11 DIAGNOSIS — Z13.0 SCREENING FOR DEFICIENCY ANEMIA: ICD-10-CM

## 2025-02-11 DIAGNOSIS — L21.9 SEBORRHEIC DERMATITIS: Primary | ICD-10-CM

## 2025-02-11 DIAGNOSIS — Z13.220 SCREENING FOR CHOLESTEROL LEVEL: ICD-10-CM

## 2025-02-11 DIAGNOSIS — Z11.59 ENCOUNTER FOR HEPATITIS C SCREENING TEST FOR LOW RISK PATIENT: ICD-10-CM

## 2025-02-11 DIAGNOSIS — Z01.83 BLOOD TYPING ENCOUNTER: ICD-10-CM

## 2025-02-11 DIAGNOSIS — Z13.1 SCREENING FOR DIABETES MELLITUS: ICD-10-CM

## 2025-02-11 DIAGNOSIS — Z13.228 SCREENING FOR METABOLIC DISORDER: ICD-10-CM

## 2025-02-11 PROCEDURE — 99214 OFFICE O/P EST MOD 30 MIN: CPT | Performed by: NURSE PRACTITIONER

## 2025-02-11 SDOH — ECONOMIC STABILITY: FOOD INSECURITY: WITHIN THE PAST 12 MONTHS, THE FOOD YOU BOUGHT JUST DIDN'T LAST AND YOU DIDN'T HAVE MONEY TO GET MORE.: NEVER TRUE

## 2025-02-11 SDOH — ECONOMIC STABILITY: FOOD INSECURITY: WITHIN THE PAST 12 MONTHS, YOU WORRIED THAT YOUR FOOD WOULD RUN OUT BEFORE YOU GOT MONEY TO BUY MORE.: SOMETIMES TRUE

## 2025-02-11 ASSESSMENT — PATIENT HEALTH QUESTIONNAIRE - PHQ9
2. FEELING DOWN, DEPRESSED OR HOPELESS: NOT AT ALL
SUM OF ALL RESPONSES TO PHQ QUESTIONS 1-9: 0
1. LITTLE INTEREST OR PLEASURE IN DOING THINGS: NOT AT ALL
SUM OF ALL RESPONSES TO PHQ9 QUESTIONS 1 & 2: 0
SUM OF ALL RESPONSES TO PHQ QUESTIONS 1-9: 0

## 2025-02-11 NOTE — TELEPHONE ENCOUNTER
Patient called and is asking that the Physical Therapy referral from [facility] be re-faxed to  Physical Therapy. That they told her they have not received anything from our office.     Tel. [phone]  Fax [phone].    Patient tel. [phone].

## 2025-02-11 NOTE — PROGRESS NOTES
\"Have you been to the ER, urgent care clinic since your last visit?  Hospitalized since your last visit?\"    NO    “Have you seen or consulted any other health care providers outside our system since your last visit?”    NO     “Have you had a pap smear?”    NO- pt states does not have GYN    No cervical cancer screening on file

## 2025-02-11 NOTE — TELEPHONE ENCOUNTER
Faxed to # provided received confirmation.     Called patient received voicemail left message to call the office back. # provided.

## 2025-02-11 NOTE — PROGRESS NOTES
General Office Visit Note    Assessment/Plan:   orders and follow up as documented in EMR    1. Seborrheic dermatitis  -     External Referral To Allergy  2. Screening for deficiency anemia  -     CBC with Auto Differential; Future  3. Screening for diabetes mellitus  -     Glucose, Random; Future  4. Screening for cholesterol level  -     Lipid Panel; Future  5. Encounter for hepatitis C screening test for low risk patient  6. Screening for thyroid disorder  -     TSH; Future  7. Screening for metabolic disorder  -     Comprehensive Metabolic Panel; Future  8. Screening for STDs (sexually transmitted diseases)  -     Chlamydia, Gonorrhea, Trichomoniasis; Future  -     HIV 1/2 Ag/Ab, 4TH Generation,W Rflx Confirm; Future  -     T. pallidum Ab; Future  -     Hepatitis C Ab, Rflx to Qt by PCR; Future  9. Blood typing encounter  -     Type and Screen; Future     Follow-up and Dispositions    Return in about 1 month (around 3/11/2025) for Well Woman wo/PAP, In Office (ONLY), Fasting Labs 1 Wk Prior.       Subjective:     Ladan is a 29 y.o. y.o. female who complains of   Chief Complaint   Patient presents with    Establish Care    Skin Problem     Requesting allery testing       Rash Review:    Ladan Mathias  presents complaining of a rash that began 3 year(s) ago. Pt states she was not exposed to none prior to the appearance of symptoms. The patients reports the rash is located on her scalp, face, neck and is red, raised, and itchy. Onset of symptoms was gradual pt states she started to notice the symptoms after childbirth, and has been unchanged since that time.The patient’s reports the rash symptoms include Itchy. she states rash has not changed over time. Patient has had previous evaluation of rash. The patient denies new exposures, including, soaps, detergents, foods, medications etc, but does have a guinea pig for 5 years . The patient denies any none. The patient has tried ketokonizole cream and shampoo from

## 2025-03-25 ENCOUNTER — TELEPHONE (OUTPATIENT)
Age: 30
End: 2025-03-25

## 2025-03-25 NOTE — TELEPHONE ENCOUNTER
Patient called in and stated that she is supposed to take the Meloxicam 7.5MG twice a day. Patient was taking the medication daily. Patient stated that she will be out of medication. Patient stated that she needs a new RX for the Meloxicam for twice a day

## 2025-03-25 NOTE — TELEPHONE ENCOUNTER
The last Rx for mobic was ordered 02/04/25 for #30 with 2 refills. No documentation noted in last office visit that pt supposed to take twice a day. Please review and advise.

## 2025-03-26 NOTE — TELEPHONE ENCOUNTER
Attempted to contact the pt regarding her message. She was not able to be reached. A message could not be left for the pt because the VM is full.

## 2025-03-28 NOTE — TELEPHONE ENCOUNTER
Second attempt to reach the pt was unsuccessful. I was able to leave a message this time asking the pt to contact the office regarding her message. Office number provided.

## 2025-05-29 DIAGNOSIS — M54.2 NECK PAIN: ICD-10-CM

## 2025-05-30 RX ORDER — MELOXICAM 7.5 MG/1
7.5 TABLET ORAL DAILY
Qty: 30 TABLET | Refills: 2 | OUTPATIENT
Start: 2025-05-30

## 2025-07-08 DIAGNOSIS — M54.2 NECK PAIN: ICD-10-CM

## 2025-07-08 RX ORDER — MELOXICAM 7.5 MG/1
7.5 TABLET ORAL DAILY
Qty: 30 TABLET | Refills: 2 | OUTPATIENT
Start: 2025-07-08

## 2025-07-09 ENCOUNTER — TELEMEDICINE (OUTPATIENT)
Age: 30
End: 2025-07-09
Payer: COMMERCIAL

## 2025-07-09 DIAGNOSIS — M79.18 MYOFASCIAL PAIN: ICD-10-CM

## 2025-07-09 DIAGNOSIS — M54.50 LUMBAR PAIN: Primary | ICD-10-CM

## 2025-07-09 DIAGNOSIS — M54.2 NECK PAIN: ICD-10-CM

## 2025-07-09 DIAGNOSIS — M62.838 MUSCLE SPASM: ICD-10-CM

## 2025-07-09 DIAGNOSIS — M47.816 LUMBAR FACET ARTHROPATHY: ICD-10-CM

## 2025-07-09 PROCEDURE — 99213 OFFICE O/P EST LOW 20 MIN: CPT | Performed by: PHYSICAL MEDICINE & REHABILITATION

## 2025-07-09 RX ORDER — MELOXICAM 7.5 MG/1
7.5 TABLET ORAL DAILY
Qty: 30 TABLET | Refills: 4 | Status: SHIPPED | OUTPATIENT
Start: 2025-07-09

## 2025-07-09 NOTE — PATIENT INSTRUCTIONS
good idea to know your test results and keep a list of the medicines you take.  Current as of: March 9, 2022               Content Version: 13.5  © 2006-2022 [employer].   Care instructions adapted under license by [employer]. If you have questions about a medical condition or this instruction, always ask your healthcare professional. [employer] disclaims any warranty or liability for your use of this information.

## 2025-07-09 NOTE — PROGRESS NOTES
[facility]  [address] [place] [address]., [address]  [place] [address]  Phone: [phone]  Fax: [phone]    TELEPHONE VISIT    Pt's YOB: 1995    ASSESSMENT   Ladan was seen today for back problem, pain and back pain.    Diagnoses and all orders for this visit:    Lumbar pain  -     External Referral To Physical Therapy    Lumbar facet arthropathy  -     External Referral To Physical Therapy    Muscle spasm  -     External Referral To Physical Therapy    Myofascial pain  -     External Referral To Physical Therapy    Neck pain  -     meloxicam (MOBIC) 7.5 MG tablet; Take 1 tablet by mouth daily  -     External Referral To Physical Therapy         IMPRESSION AND PLAN:  Ladan Mathias is a 30 y.o. female with history of lumbar and neck pain. Since last visit, pt feels her symptoms have improved. Patient is currently taking Mobic 7.5mg QD. Patient discontinued Celebrex d/t nightmares.     Pt was given information on back arthritis exercises.   Rx Mobic 7.5mg QD as needed for pain.   Ambulatory referral to PT.  Ms. Mathias has a reminder for a \"due or due soon\" health maintenance. I have asked that she contact her primary care provider, Junito Kunz APRN - NP, for follow-up on this health maintenance.   demonstrated consistency with prescribing.     Return in about 3 months (around 10/9/2025) for PT follow up, Medication follow up.    Patient-Reported Vitals  No data recorded      Ladan Mathias was evaluated through a patient-initiated, synchronous (real-time) audio only encounter. She (or guardian if applicable) is aware that it is a billable service, which includes applicable co-pays, with coverage as determined by her insurance carrier. This visit was conducted with the patient's (and/or legan guardian's) verbal consent. She has not had a related appointment within my department in the past 7 days or scheduled within the next 24 hours. The patient was

## 2025-07-09 NOTE — PROGRESS NOTES
Ladan Bowlingison presents today for   Chief Complaint   Patient presents with    Back Problem    Pain    Back Pain       Is someone accompanying this pt? no    Is the patient using any DME equipment during OV? no    Depression Screening:       No data to display                Learning Assessment:  Failed to redirect to the Timeline version of the [employer] SmartLink.    Abuse Screening:       No data to display                Fall Risk  Failed to redirect to the Timeline version of the [employer] SmartLink.    OPIOID RISK TOOL  Failed to redirect to the Timeline version of the [employer] SmartLink.    Coordination of Care:  1. Have you been to the ER, urgent care clinic since your last visit? no  Hospitalized since your last visit? no    2. Have you seen or consulted any other health care providers outside of the [facility] since your last visit? no Include any pap smears or colon screening. no